# Patient Record
Sex: FEMALE | Race: WHITE | NOT HISPANIC OR LATINO | Employment: OTHER | URBAN - METROPOLITAN AREA
[De-identification: names, ages, dates, MRNs, and addresses within clinical notes are randomized per-mention and may not be internally consistent; named-entity substitution may affect disease eponyms.]

---

## 2017-07-03 ENCOUNTER — COMMUNICATION - HEALTHEAST (OUTPATIENT)
Dept: FAMILY MEDICINE | Facility: CLINIC | Age: 47
End: 2017-07-03

## 2017-08-23 ENCOUNTER — COMMUNICATION - HEALTHEAST (OUTPATIENT)
Dept: FAMILY MEDICINE | Facility: CLINIC | Age: 47
End: 2017-08-23

## 2017-08-24 ENCOUNTER — COMMUNICATION - HEALTHEAST (OUTPATIENT)
Dept: FAMILY MEDICINE | Facility: CLINIC | Age: 47
End: 2017-08-24

## 2017-09-11 ENCOUNTER — RECORDS - HEALTHEAST (OUTPATIENT)
Dept: ADMINISTRATIVE | Facility: OTHER | Age: 47
End: 2017-09-11

## 2017-09-11 ENCOUNTER — OFFICE VISIT - HEALTHEAST (OUTPATIENT)
Dept: FAMILY MEDICINE | Facility: CLINIC | Age: 47
End: 2017-09-11

## 2017-09-11 DIAGNOSIS — M67.40 GANGLION CYST: ICD-10-CM

## 2017-09-11 DIAGNOSIS — E03.9 HYPOTHYROIDISM, UNSPECIFIED TYPE: ICD-10-CM

## 2017-09-11 DIAGNOSIS — E55.9 VITAMIN D DEFICIENCY: ICD-10-CM

## 2017-09-11 ASSESSMENT — MIFFLIN-ST. JEOR: SCORE: 1702.03

## 2017-09-13 ENCOUNTER — COMMUNICATION - HEALTHEAST (OUTPATIENT)
Dept: FAMILY MEDICINE | Facility: CLINIC | Age: 47
End: 2017-09-13

## 2017-11-03 ENCOUNTER — OFFICE VISIT - HEALTHEAST (OUTPATIENT)
Dept: FAMILY MEDICINE | Facility: CLINIC | Age: 47
End: 2017-11-03

## 2017-11-03 ENCOUNTER — COMMUNICATION - HEALTHEAST (OUTPATIENT)
Dept: SCHEDULING | Facility: CLINIC | Age: 47
End: 2017-11-03

## 2017-11-03 ENCOUNTER — RECORDS - HEALTHEAST (OUTPATIENT)
Dept: GENERAL RADIOLOGY | Facility: CLINIC | Age: 47
End: 2017-11-03

## 2017-11-03 DIAGNOSIS — R10.9 UNSPECIFIED ABDOMINAL PAIN: ICD-10-CM

## 2017-11-03 DIAGNOSIS — R10.9 ABDOMINAL PAIN: ICD-10-CM

## 2018-07-13 ENCOUNTER — OFFICE VISIT - HEALTHEAST (OUTPATIENT)
Dept: FAMILY MEDICINE | Facility: CLINIC | Age: 48
End: 2018-07-13

## 2018-07-13 DIAGNOSIS — Z65.8 PSYCHOSOCIAL STRESSORS: ICD-10-CM

## 2018-07-13 DIAGNOSIS — Z79.899 MEDICATION MANAGEMENT: ICD-10-CM

## 2018-07-13 ASSESSMENT — MIFFLIN-ST. JEOR: SCORE: 1733.32

## 2018-08-24 ENCOUNTER — OFFICE VISIT - HEALTHEAST (OUTPATIENT)
Dept: FAMILY MEDICINE | Facility: CLINIC | Age: 48
End: 2018-08-24

## 2018-08-24 DIAGNOSIS — Z12.31 VISIT FOR SCREENING MAMMOGRAM: ICD-10-CM

## 2018-08-24 DIAGNOSIS — E55.9 VITAMIN D DEFICIENCY: ICD-10-CM

## 2018-08-24 DIAGNOSIS — F34.1 DYSTHYMIC DISORDER: ICD-10-CM

## 2018-08-24 DIAGNOSIS — E03.9 HYPOTHYROIDISM, UNSPECIFIED TYPE: ICD-10-CM

## 2018-08-24 LAB — TSH SERPL DL<=0.005 MIU/L-ACNC: 1.95 UIU/ML (ref 0.3–5)

## 2018-08-27 LAB — 25(OH)D3 SERPL-MCNC: 25.5 NG/ML (ref 30–80)

## 2018-09-05 ENCOUNTER — COMMUNICATION - HEALTHEAST (OUTPATIENT)
Dept: FAMILY MEDICINE | Facility: CLINIC | Age: 48
End: 2018-09-05

## 2018-09-12 ENCOUNTER — HOSPITAL ENCOUNTER (OUTPATIENT)
Dept: MAMMOGRAPHY | Facility: CLINIC | Age: 48
Discharge: HOME | End: 2018-09-12
Attending: FAMILY MEDICINE

## 2018-09-12 ENCOUNTER — COMMUNICATION - HEALTHEAST (OUTPATIENT)
Dept: FAMILY MEDICINE | Facility: CLINIC | Age: 48
End: 2018-09-12

## 2018-09-12 DIAGNOSIS — Z12.31 VISIT FOR SCREENING MAMMOGRAM: ICD-10-CM

## 2018-11-26 ENCOUNTER — COMMUNICATION - HEALTHEAST (OUTPATIENT)
Dept: FAMILY MEDICINE | Facility: CLINIC | Age: 48
End: 2018-11-26

## 2018-11-26 DIAGNOSIS — E03.9 HYPOTHYROIDISM, UNSPECIFIED TYPE: ICD-10-CM

## 2019-02-13 ENCOUNTER — COMMUNICATION - HEALTHEAST (OUTPATIENT)
Dept: FAMILY MEDICINE | Facility: CLINIC | Age: 49
End: 2019-02-13

## 2019-07-14 ENCOUNTER — COMMUNICATION - HEALTHEAST (OUTPATIENT)
Dept: FAMILY MEDICINE | Facility: CLINIC | Age: 49
End: 2019-07-14

## 2019-07-14 DIAGNOSIS — E03.9 HYPOTHYROIDISM, UNSPECIFIED TYPE: ICD-10-CM

## 2019-10-07 ENCOUNTER — COMMUNICATION - HEALTHEAST (OUTPATIENT)
Dept: FAMILY MEDICINE | Facility: CLINIC | Age: 49
End: 2019-10-07

## 2019-10-07 DIAGNOSIS — E03.9 HYPOTHYROIDISM, UNSPECIFIED TYPE: ICD-10-CM

## 2019-10-11 ENCOUNTER — OFFICE VISIT - HEALTHEAST (OUTPATIENT)
Dept: FAMILY MEDICINE | Facility: CLINIC | Age: 49
End: 2019-10-11

## 2019-10-11 DIAGNOSIS — Z00.00 HEALTHCARE MAINTENANCE: ICD-10-CM

## 2019-10-11 DIAGNOSIS — E55.9 VITAMIN D DEFICIENCY: ICD-10-CM

## 2019-10-11 DIAGNOSIS — E03.9 HYPOTHYROIDISM, UNSPECIFIED TYPE: ICD-10-CM

## 2019-10-11 LAB
T4 FREE SERPL-MCNC: 1.4 NG/DL (ref 0.7–1.8)
TSH SERPL DL<=0.005 MIU/L-ACNC: 0.07 UIU/ML (ref 0.3–5)

## 2019-10-14 ENCOUNTER — COMMUNICATION - HEALTHEAST (OUTPATIENT)
Dept: FAMILY MEDICINE | Facility: CLINIC | Age: 49
End: 2019-10-14

## 2019-10-14 ENCOUNTER — AMBULATORY - HEALTHEAST (OUTPATIENT)
Dept: FAMILY MEDICINE | Facility: CLINIC | Age: 49
End: 2019-10-14

## 2019-10-14 DIAGNOSIS — E03.9 HYPOTHYROIDISM, UNSPECIFIED TYPE: ICD-10-CM

## 2019-10-14 LAB — 25(OH)D3 SERPL-MCNC: 41.9 NG/ML (ref 30–80)

## 2019-11-07 ENCOUNTER — COMMUNICATION - HEALTHEAST (OUTPATIENT)
Dept: ADMINISTRATIVE | Facility: CLINIC | Age: 49
End: 2019-11-07

## 2019-11-25 ENCOUNTER — OFFICE VISIT - HEALTHEAST (OUTPATIENT)
Dept: FAMILY MEDICINE | Facility: CLINIC | Age: 49
End: 2019-11-25

## 2019-11-25 DIAGNOSIS — Z13.220 SCREENING CHOLESTEROL LEVEL: ICD-10-CM

## 2019-11-25 DIAGNOSIS — E03.9 HYPOTHYROIDISM, UNSPECIFIED TYPE: ICD-10-CM

## 2019-11-25 DIAGNOSIS — Z00.00 ROUTINE GENERAL MEDICAL EXAMINATION AT A HEALTH CARE FACILITY: ICD-10-CM

## 2019-11-25 DIAGNOSIS — Z13.1 SCREENING FOR DIABETES MELLITUS: ICD-10-CM

## 2019-11-25 DIAGNOSIS — E78.5 HYPERLIPIDEMIA, UNSPECIFIED HYPERLIPIDEMIA TYPE: ICD-10-CM

## 2019-11-25 ASSESSMENT — PATIENT HEALTH QUESTIONNAIRE - PHQ9: SUM OF ALL RESPONSES TO PHQ QUESTIONS 1-9: 0

## 2019-11-25 ASSESSMENT — MIFFLIN-ST. JEOR: SCORE: 1436.33

## 2019-12-03 ENCOUNTER — AMBULATORY - HEALTHEAST (OUTPATIENT)
Dept: LAB | Facility: CLINIC | Age: 49
End: 2019-12-03

## 2019-12-03 DIAGNOSIS — Z13.1 SCREENING FOR DIABETES MELLITUS: ICD-10-CM

## 2019-12-03 DIAGNOSIS — E03.9 HYPOTHYROIDISM, UNSPECIFIED TYPE: ICD-10-CM

## 2019-12-03 DIAGNOSIS — Z13.220 SCREENING CHOLESTEROL LEVEL: ICD-10-CM

## 2019-12-03 LAB
CHOLEST SERPL-MCNC: 206 MG/DL
FASTING STATUS PATIENT QL REPORTED: YES
FASTING STATUS PATIENT QL REPORTED: YES
GLUCOSE BLD-MCNC: 87 MG/DL (ref 70–125)
HDLC SERPL-MCNC: 74 MG/DL
LDLC SERPL CALC-MCNC: 98 MG/DL
TRIGL SERPL-MCNC: 168 MG/DL
TSH SERPL DL<=0.005 MIU/L-ACNC: 2.94 UIU/ML (ref 0.3–5)

## 2019-12-04 ENCOUNTER — AMBULATORY - HEALTHEAST (OUTPATIENT)
Dept: FAMILY MEDICINE | Facility: CLINIC | Age: 49
End: 2019-12-04

## 2019-12-04 ENCOUNTER — COMMUNICATION - HEALTHEAST (OUTPATIENT)
Dept: FAMILY MEDICINE | Facility: CLINIC | Age: 49
End: 2019-12-04

## 2019-12-04 DIAGNOSIS — E03.9 HYPOTHYROIDISM, UNSPECIFIED TYPE: ICD-10-CM

## 2020-06-03 ENCOUNTER — COMMUNICATION - HEALTHEAST (OUTPATIENT)
Dept: FAMILY MEDICINE | Facility: CLINIC | Age: 50
End: 2020-06-03

## 2020-06-03 DIAGNOSIS — E03.9 HYPOTHYROIDISM, UNSPECIFIED TYPE: ICD-10-CM

## 2021-04-29 ENCOUNTER — RECORDS - HEALTHEAST (OUTPATIENT)
Dept: FAMILY MEDICINE | Facility: CLINIC | Age: 51
End: 2021-04-29

## 2021-04-29 DIAGNOSIS — E03.9 HYPOTHYROIDISM, UNSPECIFIED TYPE: ICD-10-CM

## 2021-04-30 RX ORDER — LEVOTHYROXINE SODIUM 150 UG/1
150 TABLET ORAL DAILY
Qty: 30 TABLET | Refills: 0 | Status: SHIPPED | OUTPATIENT
Start: 2021-04-30 | End: 2021-07-20

## 2021-05-07 ENCOUNTER — AMBULATORY - HEALTHEAST (OUTPATIENT)
Dept: NURSING | Facility: CLINIC | Age: 51
End: 2021-05-07

## 2021-05-25 ENCOUNTER — COMMUNICATION - HEALTHEAST (OUTPATIENT)
Dept: FAMILY MEDICINE | Facility: CLINIC | Age: 51
End: 2021-05-25

## 2021-05-25 ENCOUNTER — AMBULATORY - HEALTHEAST (OUTPATIENT)
Dept: NURSING | Facility: CLINIC | Age: 51
End: 2021-05-25

## 2021-05-25 DIAGNOSIS — E03.9 HYPOTHYROIDISM, UNSPECIFIED TYPE: ICD-10-CM

## 2021-05-26 ASSESSMENT — PATIENT HEALTH QUESTIONNAIRE - PHQ9: SUM OF ALL RESPONSES TO PHQ QUESTIONS 1-9: 0

## 2021-05-30 NOTE — TELEPHONE ENCOUNTER
Refill Approved    Rx renewed per Medication Renewal Policy. Medication was last renewed on 11/28/18 #90 R-2.    Last OV 8/24/18  *Patient will need follow up with PCP prior to future refills.     Miri Byrd, [facility] Connection Triage/Med Refill 7/14/2019     Requested Prescriptions   Pending Prescriptions Disp Refills     levothyroxine (SYNTHROID, LEVOTHROID) 175 MCG tablet [Pharmacy Med Name: LEVOTHYROXINE 175 MCG TABLET] 90 tablet 2     Sig: TAKE ONE TABLET BY MOUTH ONE TIME DAILY.       Thyroid Hormones Protocol Passed - 7/14/2019 12:30 AM        Passed - Provider visit in past 12 months or next 3 months     Last office visit with prescriber/PCP: 8/24/2018 Karyn Mcdermott MD OR same dept: 8/24/2018 Karyn Mcdermott MD OR same specialty: 8/24/2018 Karyn Mcdermott MD  Last physical: Visit date not found Last MTM visit: Visit date not found   Next visit within 3 mo: Visit date not found  Next physical within 3 mo: Visit date not found  Prescriber OR PCP: Karyn Mcdermott MD  Last diagnosis associated with med order: 1. Hypothyroidism, unspecified type  - levothyroxine (SYNTHROID, LEVOTHROID) 175 MCG tablet [Pharmacy Med Name: LEVOTHYROXINE 175 MCG TABLET]; TAKE ONE TABLET BY MOUTH ONE TIME DAILY.  Dispense: 90 tablet; Refill: 2    If protocol passes may refill for 12 months if within 3 months of last provider visit (or a total of 15 months).             Passed - TSH on file in past 12 months for patient age 12 & older     TSH   Date Value Ref Range Status   08/24/2018 1.95 0.30 - 5.00 uIU/mL Final

## 2021-05-31 VITALS — BODY MASS INDEX: 40 KG/M2 | WEIGHT: 240.1 LBS | HEIGHT: 65 IN

## 2021-05-31 VITALS — WEIGHT: 236.1 LBS | BODY MASS INDEX: 39.9 KG/M2

## 2021-06-01 VITALS — WEIGHT: 247 LBS | BODY MASS INDEX: 41.15 KG/M2 | HEIGHT: 65 IN

## 2021-06-01 VITALS — BODY MASS INDEX: 41.61 KG/M2 | WEIGHT: 246.2 LBS

## 2021-06-02 NOTE — TELEPHONE ENCOUNTER
Left message for patient to call back. If patient calls back, please relay message below.    Patient would need to be seen at an office visit with Dr. Mcdermott before her medications can be refilled. She is overdue for labs.    Please assist in scheduling

## 2021-06-02 NOTE — PROGRESS NOTES
Chief complaint: Thyroid check  Vaccine update    HPI: The patient has been on a structured weight loss program and has been very successful.  Most of the meals are prepackaged.  She is starting to feel as though she wants to try to exercise.  She has about 20 more pounds to lose before she has her goal.    She is getting a flu shot today and will also update her tetanus shot since she has a new grandbaby who is due to be delivered next month    She is also due for a complete physical exam so we can repeat fasting lipids so she will get that scheduled as well    Objective:/80 (Patient Site: Right Arm, Patient Position: Sitting, Cuff Size: Adult Regular)   Pulse 92   Wt 179 lb 11.2 oz (81.5 kg)   SpO2 98%   BMI 30.37 kg/m    She was congratulated on her weight loss    Assessment: Hypothyroidism  Intentional weight loss  Vaccine update  Vitamin D deficiency    Plan: We will do TSH, free T4, and vitamin D because that has been low  When the lab results are returned I will refill her thyroid hormone  We will update her flu shot and her tetanus shot and follow-up with her physical

## 2021-06-02 NOTE — TELEPHONE ENCOUNTER
RN cannot approve Refill Request    RN can NOT refill this medication Protocol failed and NO refill given        Andreia Bonilla, [facility] Connection Triage/Med Refill 10/8/2019    Requested Prescriptions   Pending Prescriptions Disp Refills     levothyroxine (SYNTHROID, LEVOTHROID) 175 MCG tablet [Pharmacy Med Name: LEVOTHYROXINE 175 MCG TABLET] 90 tablet 3     Sig: TAKE 1 TABLET BY MOUTH EVERY DAY       Thyroid Hormones Protocol Failed - 10/7/2019  1:56 AM        Failed - Provider visit in past 12 months or next 3 months     Last office visit with prescriber/PCP: 8/24/2018 Karyn Mcdermott MD OR same dept: Visit date not found OR same specialty: 8/24/2018 Karyn Mcdermott MD  Last physical: Visit date not found Last MTM visit: Visit date not found   Next visit within 3 mo: Visit date not found  Next physical within 3 mo: Visit date not found  Prescriber OR PCP: Karyn Mcdermott MD  Last diagnosis associated with med order: 1. Hypothyroidism, unspecified type  - levothyroxine (SYNTHROID, LEVOTHROID) 175 MCG tablet [Pharmacy Med Name: LEVOTHYROXINE 175 MCG TABLET]; TAKE 1 TABLET BY MOUTH EVERY DAY  Dispense: 90 tablet; Refill: 0    If protocol passes may refill for 12 months if within 3 months of last provider visit (or a total of 15 months).             Failed - TSH on file in past 12 months for patient age 12 & older     TSH   Date Value Ref Range Status   08/24/2018 1.95 0.30 - 5.00 uIU/mL Final

## 2021-06-03 VITALS
OXYGEN SATURATION: 98 % | DIASTOLIC BLOOD PRESSURE: 80 MMHG | BODY MASS INDEX: 30.37 KG/M2 | HEART RATE: 92 BPM | SYSTOLIC BLOOD PRESSURE: 142 MMHG | WEIGHT: 179.7 LBS

## 2021-06-03 NOTE — PROGRESS NOTES
ASSESSMENT:  1. Routine general medical examination at a health care facility  Recommended mammogram    2. Hypothyroidism, unspecified type     - Thyroid Stimulating Hormone (TSH); Future    3. Hyperlipidemia, unspecified hyperlipidemia type       4. Screening for diabetes mellitus     - Glucose; Future    5. Screening cholesterol level     - Lipid Cascade; Future           PLAN:  There are no Patient Instructions on file for this visit.    Orders Placed This Encounter   Procedures     Lipid Cascade     Standing Status:   Future     Standing Expiration Date:   11/25/2020     Order Specific Question:   Fasting is required?     Answer:   Yes     Glucose     Standing Status:   Future     Standing Expiration Date:   11/25/2020     Thyroid Stimulating Hormone (TSH)     Standing Status:   Future     Standing Expiration Date:   11/25/2020     There are no discontinued medications.    Return in about 1 year (around 11/25/2020) for Annual physical.    Health Maintenance Due   Topic Date Due     PREVENTIVE CARE VISIT  1970     HPV TEST  1970     HIV SCREENING  07/09/1985     PAP SMEAR  07/09/1995     DEPRESSION FOLLOW UP  02/24/2019     MAMMOGRAM  09/12/2019       CHIEF COMPLAINT:  Chief Complaint   Patient presents with     Annual Exam     yearly - pt is not fasting      Depression     F/U        HISTORY OF PRESENT ILLNESS:  Ama Green is a 49 y.o. female presenting to the clinic today for a physical exam.     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  She is in a new relationship and sexually active and does not need me to do any STD screening.  She just became a grandmother for the first time last Thursday and she was able to be present after the delivery.    She is not fasting today but will come back and have fasting blood work completed.    Healthy Habits  Are you taking a daily aspirin? No  Do you typically exercising at least 40 min, 3-4 times per week?  Yes  Do you usually eat at least 4 servings  of fruit and vegetables a day, include whole grains and fiber and avoid regularly eating high fat foods? NO  Have you had an eye exam in the past two years? Yes  Do you see a dentist twice per year? NO  Do you have any concerns regarding sleep? No  Do you drink caffeine? YES    Safety Screen  If you own firearms, are they secured in a locked gun cabinet or with trigger locks? The patient does not own any firearms    REVIEW OF SYSTEMS:   All other systems are negative.    Immunization History   Administered Date(s) Administered     Influenza, Seasonal, Inj PF IIV3 10/31/2013     Influenza,seasonal quad, PF, =/> 6months 2015     Influenza,seasonal,quad inj =/> 6months 2016, 10/11/2019     Tdap 2009, 10/11/2019       GYNECOLOGIC HISTORY:  Last menstrual period: No LMP recorded. Patient has had a hysterectomy.  Contraception: condoms  Last Pap: hysterectomy Results were:    Last mammogram:   Results were: normal    OB History        2    Para   2    Term   2            AB        Living   2       SAB        TAB        Ectopic        Multiple        Live Births                     PFSH:     Social History     Tobacco Use   Smoking Status Never Smoker   Smokeless Tobacco Never Used     No family history on file.  Social History     Socioeconomic History     Marital status:      Spouse name: Darnell     Number of children: 2     Years of education: None     Highest education level: None   Occupational History     Occupation: sales   Social Needs     Financial resource strain: None     Food insecurity:     Worry: None     Inability: None     Transportation needs:     Medical: None     Non-medical: None   Tobacco Use     Smoking status: Never Smoker     Smokeless tobacco: Never Used   Substance and Sexual Activity     Alcohol use: None     Drug use: None     Sexual activity: None   Lifestyle     Physical activity:     Days per week: None     Minutes per session: None     Stress: None  "  Relationships     Social connections:     Talks on phone: None     Gets together: None     Attends Church service: None     Active member of club or organization: None     Attends meetings of clubs or organizations: None     Relationship status: None     Intimate partner violence:     Fear of current or ex partner: None     Emotionally abused: None     Physically abused: None     Forced sexual activity: None   Other Topics Concern     None   Social History Narrative     None     Past Surgical History:   Procedure Laterality Date     HYSTERECTOMY  2007     [place]  DELIVERY ONLY      Description:  Section;  Recorded: 2011;  Comments: 89 girl     [place]  DELIVERY ONLY      Description:  Section;  Recorded: 2011;  Comments: 89 girl     OK OPEN TX DISTAL PHALANGEAL FRACTURE EACH      Description: Open Treatment Of Fracture Of One Finger, Distal Phalanx;  Recorded: 2011;  Comments: right 5th finger; twice     OK REDUCTION OF LARGE BREAST      Description: Breast Surgery Reduction Procedure Bilateral;  Recorded: 2011;  Comments:      OK REMOVE TONSILS/ADENOIDS,<11 Y/O      Description: Tonsillectomy With Adenoidectomy;  Recorded: 2011;  Comments: X 2;  and      OK TOTAL ABDOM HYSTERECTOMY      Description: Hysterectomy;  Recorded: 2011;  Comments: enlarged uterus and 10\" polyp     REDUCTION MAMMAPLASTY Bilateral      Allergies   Allergen Reactions     Amoxicillin Anaphylaxis and Hives     Active Ambulatory Problems     Diagnosis Date Noted     Functional Urinary Incontinence      Hypothyroidism      Hyperlipidemia 01/15/2016     Resolved Ambulatory Problems     Diagnosis Date Noted     Anxiety      Depression      Dysthymic disorder      Vitamin D deficiency      Trochanteric Bursitis      No Additional Past Medical History       VITALS:  Vitals:    19 1402   BP: 146/81   Weight: 182 lb 6.4 oz (82.7 kg)   Height: 5' 4.25\" " (1.632 m)     BP Readings from Last 3 Encounters:   11/25/19 146/81   10/11/19 142/80   08/24/18 102/64     Wt Readings from Last 3 Encounters:   11/25/19 182 lb 6.4 oz (82.7 kg)   10/11/19 179 lb 11.2 oz (81.5 kg)   08/24/18 (!) 246 lb 3.2 oz (111.7 kg)     Body mass index is 31.07 kg/m .    PHYSICAL EXAM:  General Appearance: Alert, cooperative, no distress, appears stated age  Head: Normocephalic, without obvious abnormality, atraumatic  Eyes: PERRL, conjunctiva/corneas clear, EOM's intact  Ears: Normal TM's and external ear canals, both ears  Nose: Nares normal, septum midline,mucosa normal, no drainage  Throat: Lips, mucosa, and tongue normal; teeth and gums normal  Neck: Supple, symmetrical, trachea midline, no adenopathy;  thyroid: not enlarged, symmetric, no tenderness/mass/nodules;    Back: Symmetric, no curvature, ROM normal, no CVA tenderness  Lungs: Clear to auscultation bilaterally, respirations unlabored  Breasts: No breast masses, tenderness, asymmetry, or nipple discharge.  Heart: Regular rate and rhythm, S1 and S2 normal, no murmur, rub, or gallop, Abdomen: Soft, non-tender, bowel sounds active all four quadrants,  no masses, no organomegaly  Pelvic:Not examined  Extremities: Extremities normal, atraumatic, no cyanosis or edema  Skin: Skin color, texture, turgor normal, no rashes or lesions  Lymph nodes: Cervical, supraclavicular, and axillary nodes normal  Neurologic: Normal       QUALITY MEASURES:  The following high BMI interventions were performed this visit: encouragement to exercise and weight monitoring       MEDICATIONS:  Current Outpatient Medications   Medication Sig Dispense Refill     levothyroxine (SYNTHROID, LEVOTHROID) 150 MCG tablet Take 1 tablet (150 mcg total) by mouth Daily at 6:00 am. 90 tablet 0     No current facility-administered medications for this visit.

## 2021-06-04 VITALS
HEIGHT: 64 IN | WEIGHT: 182.4 LBS | BODY MASS INDEX: 31.14 KG/M2 | SYSTOLIC BLOOD PRESSURE: 146 MMHG | DIASTOLIC BLOOD PRESSURE: 81 MMHG

## 2021-06-08 NOTE — TELEPHONE ENCOUNTER
Refill Approved    Rx renewed per Medication Renewal Policy. Medication was last renewed on 12/4/19, last OV 11/25/19.    Brenna Barrios, [facility] Triage/Med Refill 6/7/2020     Requested Prescriptions   Pending Prescriptions Disp Refills     levothyroxine (SYNTHROID, LEVOTHROID) 150 MCG tablet [Pharmacy Med Name: LEVOTHYROXINE 150 MCG TABLET] 90 tablet 3     Sig: TAKE 1 TABLET (150 MCG TOTAL) BY MOUTH DAILY AT 6:00 AM.       Thyroid Hormones Protocol Passed - 6/3/2020 12:03 PM        Passed - Provider visit in past 12 months or next 3 months     Last office visit with prescriber/PCP: 10/11/2019 Karyn Mcdermott MD OR same dept: 10/11/2019 Karyn Mcdermott MD OR same specialty: 10/11/2019 Karyn Mcdermott MD  Last physical: 11/25/2019 Last MTM visit: Visit date not found   Next visit within 3 mo: Visit date not found  Next physical within 3 mo: Visit date not found  Prescriber OR PCP: Karyn Mcdermott MD  Last diagnosis associated with med order: 1. Hypothyroidism, unspecified type  - levothyroxine (SYNTHROID, LEVOTHROID) 150 MCG tablet [Pharmacy Med Name: LEVOTHYROXINE 150 MCG TABLET]; Take 1 tablet (150 mcg total) by mouth Daily at 6:00 am.  Dispense: 90 tablet; Refill: 3    If protocol passes may refill for 12 months if within 3 months of last provider visit (or a total of 15 months).             Passed - TSH on file in past 12 months for patient age 12 & older     TSH   Date Value Ref Range Status   12/03/2019 2.94 0.30 - 5.00 uIU/mL Final

## 2021-06-12 NOTE — PROGRESS NOTES
"Complaint: Thyroid medication check  Mass on left wrist    HPI: The patient is frustrated with her weight gain but otherwise feels great.  She is a  got a presidents award for [id number] this past year.  She loves it and wishes she had taken this career path many years ago.  She feels as though the thyroid dose is appropriate.  She is not fasting today which is fine because once her thyroid is appropriately regulated, her cholesterol is well managed.    She also reports a growing soft tissue mass in the dorsum of her left wrist.  It is uncomfortable and a little bit unsightly.    Objective:/88 (Patient Site: Right Arm, Patient Position: Sitting, Cuff Size: Adult Large)  Pulse 68  Ht 5' 4.5\" (1.638 m)  Wt (!) 240 lb 1.6 oz (108.9 kg)  Breastfeeding? No  BMI 40.58 kg/m2  I reviewed her labs and she is very overdue for having blood work completed for her thyroid.    Is a soft tissue cystic mass on the dorsum of her left wrist consistent with a ganglion cyst.    Assessment: Hypothyroidism  Ganglion cyst left wrist    Plan: I will refill her thyroid medication but I will check her thyroid and her vitamin D level.  She will be notified of those results.  I will also send her to a orthopedic surgeon and the hand surgery group to discuss options because likely she would like to have that drained before she would have a surgical approach.  "

## 2021-06-13 NOTE — PROGRESS NOTES
"  Subjective:      Ama Green is a 47 y.o. female who presents today for acute abdominal pain.    Vani states her abdominal pain started about 10 days ago on 10/25/2015.  She states last weekend she had severe abdominal pain 7/10.  She endorses not having a regular BM- which she normal has a BM daily.  She noted that a jarring car ride, after eating, and long periods of sitting make the pain worse.  Last weekend on Sunday she took ExLax 4 tablets and 2 tablets again on Monday.  She states she had minor diarrhea after the 2 doses of Ex lax.  On Tuesday, 10/31/2017, she said the pain was gone.  On Wednesday, 11/1/2017, she notes she felt \"okay\".  She also states she started a colon cleanse on Wednesday with no results of BM  Thursday-yesterday, 11/2/2017, she notes having abdominal bloating and mid back pain that started around 10 pm.  She states she was so uncomfortable in bed she tried sleeping in the chair and couch and was still unable to get comfortable.  Last night she rates her pain 5/10 and describes the pain as achy and discomfort. She has not been evaluated for symptoms.   Today she states her pain is more achy pain and rates it 2/10.  She has noted urinary frequency but also endorses increased water intake.  She notes feeling like she can't empty her bladder completely when she voids.  Denies blood in stool and urine, painful urination, urine frequency, urgency.   Denies fever, chills, left lower abdominal pain.   Endorses eating higher protein and ketogenic diet which is new for her. Increasing fatty foods.   Decrease in appetite 10 days ago with symptom start.  History of hysterectomy    She does have a history in 2004 of having had a kidney infection without symptoms. Presents today to rule out urinary infection before the weekend.     Reviewed past medical/surgical history, family history, social history, medications and updated chart below.       Allergies   Allergen Reactions     Amoxicillin " "Anaphylaxis and Hives     History reviewed. No pertinent past medical history.  Past Surgical History:   Procedure Laterality Date     [place]  DELIVERY ONLY      Description:  Section;  Recorded: 2011;  Comments: 89 girl     [place]  DELIVERY ONLY      Description:  Section;  Recorded: 2011;  Comments: 89 girl     [place] OPEN TX DISTAL PHALANGEAL FRACTURE EACH      Description: Open Treatment Of Fracture Of One Finger, Distal Phalanx;  Recorded: 2011;  Comments: right 5th finger; twice     [place] REDUCTION OF LARGE BREAST      Description: Breast Surgery Reduction Procedure Bilateral;  Recorded: 2011;  Comments:      [place] REMOVE TONSILS/ADENOIDS,<13 Y/O      Description: Tonsillectomy With Adenoidectomy;  Recorded: 2011;  Comments: X 2;  and      [place] TOTAL ABDOM HYSTERECTOMY      Description: Hysterectomy;  Recorded: 2011;  Comments: enlarged uterus and 10\" polyp     Social History     Social History     Marital status:      Spouse name: Darnell     Number of children: 2     Years of education: N/A     Occupational History     sales      Social History Main Topics     Smoking status: Never Smoker     Smokeless tobacco: Never Used     Alcohol use Not on file     Drug use: Not on file     Sexual activity: Not on file     Other Topics Concern     Not on file     Social History Narrative     History reviewed. No pertinent family history.  Current Outpatient Prescriptions   Medication Sig Dispense Refill     levothyroxine (SYNTHROID, LEVOTHROID) 175 MCG tablet TAKE ONE TABLET BY MOUTH ONE TIME DAILY. 90 tablet 3     No current facility-administered medications for this visit.      Patient Active Problem List    Diagnosis Date Noted     Hyperlipidemia 01/15/2016     Functional Urinary Incontinence      Hypothyroidism      Dysthymic Disorder      Vitamin D Deficiency        Review of Systems   Constitutional: Negative.   HENT: Negative.   Eyes: " Negative.   Respiratory: Negative.   Cardiovascular: Negative.   Gastrointestinal: Abdominal pain that radiates to mid back.  Decreased appetite over past 10 days.  Diarrhea and less frequent BMs.   Endocrine: Negative.   Genitourinary: Denies dysuria.  Endorses feeling like she is not able to empty her bladder fully.   Musculoskeletal: Negative.   Skin: Negative.   Allergic/Immunologic: Negative.   Neurological: Negative.   Hematological: Negative.   Psychiatric/Behavioral: Negative.     Objective:    Physical Exam   /84 (Patient Site: Left Arm, Patient Position: Sitting, Cuff Size: Adult Large)  Pulse 72  Wt (!) 236 lb 1.6 oz (107.1 kg)  BMI 39.9 kg/m2    Constitutional: Alert and oriented x3, well nourished without any acute distress  Cardiovascular: Normal S1 and S2. Regular rate, rhythm and no murmur, rubs or gallops. Palpable distal pulses bilaterally.  Pulmonary/Chest: Normal effort. Lungs clear to auscultation in all lobes. Without wheezing, rhonchi or crackles.    Abdominal: Soft.  Slight tenderness to palpation of right lower quadrant. There is no rebound or guarding. Bowel sounds x4. Without organomegaly. No CVA tenderness.  Skin: Dry and intact; without rashes or lesions.   Psychiatric: Normal mood and affect.     Assessment/Plan:    1. Abdominal pain  10 day history of abdominal pain.  Denies nausea, vomiting,fever, chills and recent sick contacts.  Endorses having pain after eating starting 10/25/2017.  Endorses having decreased appetites and diarrhea.  Xray was normal and did not show any constipation or cause for her abdominal pain.   Pain noted with palpation to right lower quadrant.  Of notes she had a kidney infection in 2004 without symptoms.  I will order labs to rule out kidney/bladder infection, gallbladder issues/gallstones, infectious process, and constipation.  I will follow up with results once received. I did recommend she avoid fried/fatty foods since her pain is occurring after  eating.   Discussed red flags that would warrant urgent evaluation if needed over the weekend. Patient verbalized understanding.  Discussed with patient to follow up with PCP if worsening symptoms, questions, or concerns.  Patient agreed and appeared pleased with plan      XR Abdomen Flat and Upright (Order [id number])   Imaging   Date: 11/3/2017 Department: [facility] X-Ray Released By: RT Laura (R) Authorizing: Marybeth Clark NP   Study Result   XR ABDOMEN FLAT AND UPRIGHT  11/3/2017 12:34 PM     INDICATION: Constipation, chronic abdominal pain.  COMPARISON: None.     FINDINGS: Negative abdomen. Bowel gas pattern is normal. Nothing for obstruction or free air. No evidence for renal stones. Slight convex right lumbar curve.     This report was electronically interpreted by: Dr. Chihco Murphy MD ON 11/03/2017 at 13:33         - Urinalysis-UC if Indicated  - Lipase  - Comprehensive Metabolic Panel  - HM1(CBC and Differential)  - XR Abdomen Flat and Upright; Future  - HM1 (CBC with Diff)    Recent Results (from the past 72 hour(s))   Urinalysis-UC if Indicated   Result Value Ref Range    Color, UA Yellow Colorless, Yellow, Straw, Light Yellow    Clarity, UA Clear Clear    Glucose, UA Negative Negative    Bilirubin, UA Negative Negative    Ketones, UA Trace (!) Negative    Specific Gravity, UA 1.010 1.005 - 1.030    Blood, UA Negative Negative    pH, UA 6.0 5.0 - 8.0    Protein, UA Negative Negative mg/dL    Urobilinogen, UA 0.2 E.U./dL 0.2 E.U./dL, 1.0 E.U./dL    Nitrite, UA Negative Negative    Leukocytes, UA Negative Negative   Lipase   Result Value Ref Range    Lipase 41 0 - 52 U/L   Comprehensive Metabolic Panel   Result Value Ref Range    Sodium 141 136 - 145 mmol/L    Potassium 4.0 3.5 - 5.0 mmol/L    Chloride 104 98 - 107 mmol/L    CO2 26 22 - 31 mmol/L    Anion Gap, Calculation 11 5 - 18 mmol/L    Glucose 93 70 - 125 mg/dL    BUN 5 (L) 8 - 22 mg/dL    Creatinine 0.62 0.60 - 1.10 mg/dL     GFR MDRD Af Amer >60 >60 mL/min/1.73m2    GFR MDRD Non Af Amer >60 >60 mL/min/1.73m2    Bilirubin, Total 0.4 0.0 - 1.0 mg/dL    Calcium 8.9 8.5 - 10.5 mg/dL    Protein, Total 6.7 6.0 - 8.0 g/dL    Albumin 3.3 (L) 3.5 - 5.0 g/dL    Alkaline Phosphatase 53 45 - 120 U/L    AST 13 0 - 40 U/L    ALT 14 0 - 45 U/L   HM1 (CBC with Diff)   Result Value Ref Range    WBC 7.7 4.0 - 11.0 thou/uL    RBC 4.77 3.80 - 5.40 mill/uL    Hemoglobin 14.4 12.0 - 16.0 g/dL    Hematocrit 42.3 35.0 - 47.0 %    MCV 89 80 - 100 fL    MCH 30.2 27.0 - 34.0 pg    MCHC 34.0 32.0 - 36.0 g/dL    RDW 11.5 11.0 - 14.5 %    Platelets 323 140 - 440 thou/uL    MPV 6.9 (L) 7.0 - 10.0 fL    Neutrophils % 59 50 - 70 %    Lymphocytes % 31 20 - 40 %    Monocytes % 7 2 - 10 %    Eosinophils % 3 0 - 6 %    Basophils % 1 0 - 2 %    Neutrophils Absolute 4.5 2.0 - 7.7 thou/uL    Lymphocytes Absolute 2.3 0.8 - 4.4 thou/uL    Monocytes Absolute 0.5 0.0 - 0.9 thou/uL    Eosinophils Absolute 0.2 0.0 - 0.4 thou/uL    Basophils Absolute 0.1 0.0 - 0.2 thou/uL     Office visit was over 40 minutes, obtaining xray, labs - reviewing and over 50% was spent counseling Ama.   Office visit and charting was completed with Grisel Lundy, studentNP      Marybeth Clark, CNP  11/5/2017

## 2021-06-17 NOTE — TELEPHONE ENCOUNTER
RN cannot approve Refill Request    RN can NOT refill this medication PCP messaged that patient is overdue for Office Visit. Last office visit: 10/11/2019 Karyn Mcdermott MD Last Physical: 11/25/2019 Last MTM visit: Visit date not found Last visit same specialty: 10/11/2019 Karyn Mcdermott MD.  Next visit within 3 mo: Visit date not found  Next physical within 3 mo: Visit date not found      Brenna Barrios, [facility] Triage/Med Refill 4/29/2021    Requested Prescriptions   Pending Prescriptions Disp Refills     levothyroxine (SYNTHROID, LEVOTHROID) 150 MCG tablet [Pharmacy Med Name: LEVOTHYROXINE 150 MCG TABLET] 90 tablet 1     Sig: TAKE 1 TABLET (150 MCG TOTAL) BY MOUTH DAILY AT 6:00 AM.       Thyroid Hormones Protocol Failed - 4/29/2021  1:36 AM        Failed - Provider visit in past 12 months or next 3 months     Last office visit with prescriber/PCP: 10/11/2019 Karyn Mcdermott MD OR same dept: Visit date not found OR same specialty: 10/11/2019 Karyn Mcdermott MD  Last physical: 11/25/2019 Last MTM visit: Visit date not found   Next visit within 3 mo: Visit date not found  Next physical within 3 mo: Visit date not found  Prescriber OR PCP: Karyn Mcdermott MD  Last diagnosis associated with med order: 1. Hypothyroidism, unspecified type  - levothyroxine (SYNTHROID, LEVOTHROID) 150 MCG tablet [Pharmacy Med Name: LEVOTHYROXINE 150 MCG TABLET]; Take 1 tablet (150 mcg total) by mouth Daily at 6:00 am.  Dispense: 90 tablet; Refill: 1    If protocol passes may refill for 12 months if within 3 months of last provider visit (or a total of 15 months).             Failed - TSH on file in past 12 months for patient age 12 & older     TSH   Date Value Ref Range Status   12/03/2019 2.94 0.30 - 5.00 uIU/mL Final

## 2021-06-17 NOTE — TELEPHONE ENCOUNTER
I called and LVMTCB, if/when patient calls back, please inform patient that they are due for a physical / medication check with PCP.

## 2021-06-19 NOTE — LETTER
Letter by Karyn Mcdermott MD at      Author: Karyn Mcdermott MD Service: -- Author Type: --    Filed:  Encounter Date: 11/7/2019 Status: Signed         Ama Green  [address]  [facility] [address]               November 7, 2019    Dear Ama:    Our records indicate that you are due for a mammogram.    In the [place], one in nine women will develop breast cancer during their lifetime. While there is no way to prevent breast cancer, early detection provides the best opportunity for curing it.    For women over the age of 40, the American Cancer Society recommends a yearly clinical breast exam and a yearly mammogram. These practices have saved thousands of lives. We need your help to ensure that you are receiving optimal medical care.    Please make an appointment for a mammogram at your earliest convenience. [phone]    Sincerely,        Karyn Mcdermott MD

## 2021-06-19 NOTE — PROGRESS NOTES
"Chief complaint: Depression anxiety    HPI: The patient has had a lot of psychosocial stressors lately.  She has 2 adult daughters.  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  Her  has had lots of health issues and has had a drinking problem in the past and he quit drinking about a year and a half ago.  He still has a lot of anger and a lot of issues that he is not dealing with and now he is just a \"dry drunk\".  There have been some disc contents in their marriage of the last 25 years so she has been on a hedge for about the last 8 months or so and finally things came to a head last week where her  told her he wanted a divorce.  She now is in a planning mode to figure out how they are going to separate her assets and finances.    She has been crying herself to sleep every night for months and while she does not think that she is quite ready for counseling to do with this issue, she is ready to go back on medication for depression and anxiety.    Objective:/82  Pulse 75  Ht 5' 4.5\" (1.638 m)  Wt (!) 247 lb (112 kg)  SpO2 98%  BMI 41.74 kg/m2  Her PHQ 9 and her EVA 7 scores are very high.  She is somewhat weepy at times during the interview.  When I looked through her chart I found that 5- 6 years ago she was on Lexapro so we decided to start a low-dose of Lexapro as it had worked well for her in the past.  She will look to see if she needs to talk to a counselor and let me know if she needs to pursue that.    Assessment: Psychosocial stressors with depression and anxiety  Medications start    Plan: Start Lexapro 5 mg tablets daily and have her come back in about 6 weeks to discuss the efficacy of this in the meantime if she needs me to help her schedule an appointment with the [place] [facility] we can do that as well.    Total time spent was over 25 minutes today and all that time was counseling care coordination and discussion  "

## 2021-06-20 NOTE — PROGRESS NOTES
Chief complaint: Medication check    HPI: The patient has been going through a lot of psychosocial stressors and marital strife and her  of after 25 years decided he wanted a divorce.  We started her on a low-dose of Lexapro and she really believes that she is doing better.  She feels as though she is going to have another difficult time in a week when he moves out of the house and then in about 3 or 4 weeks when the  authorizes the final divorce decree.  She will be losing the rest of her health insurance when the divorce is final because she cannot stay on his health insurance right now    /64 (Patient Site: Right Arm, Patient Position: Sitting, Cuff Size: Adult Large)  Pulse 68  Wt (!) 246 lb 3.2 oz (111.7 kg)  Breastfeeding? No  BMI 41.61 kg/m2  Her affect is bright.  Her PHQ 9 and EVA 7 were completed.  She feels better but not all the way healed yet of course.  New    Assessment: Dysthymia with situational stressors    Plan: We will write the Lexapro so she can take 1 or 2 tablets daily as directed.  She is also due to have her vitamin D and her thyroid checked so we will do that today.  I suspect that she runs into more stressful times as when her  moves out and the divorce degree is final, she will need a little bit of boost in her medication and so I told her she can do that take 1 or 2 at a time and then go back to 1 if she needs to so she was comfortable with that plan

## 2021-06-25 NOTE — TELEPHONE ENCOUNTER
RN cannot approve Refill Request    RN can NOT refill this medication PCP messaged that patient is overdue for Labs and Office Visit. Last office visit: Visit date not found Last Physical: Visit date not found Last MTM visit: Visit date not found Last visit same specialty: 10/11/2019 Krayn Mcdermott MD.  Next visit within 3 mo: Visit date not found  Next physical within 3 mo: Visit date not found      Nata Enriquez, [facility] Triage/Med Refill 5/26/2021    Requested Prescriptions   Pending Prescriptions Disp Refills     levothyroxine (SYNTHROID, LEVOTHROID) 150 MCG tablet 30 tablet 0     Sig: Take 1 tablet (150 mcg total) by mouth Daily at 6:00 am.       Thyroid Hormones Protocol Failed - 5/25/2021  6:57 PM        Failed - Provider visit in past 12 months or next 3 months     Last office visit with prescriber/PCP: Visit date not found OR same dept: Visit date not found OR same specialty: 10/11/2019 Karyn Mcdermott MD  Last physical: Visit date not found Last MTM visit: Visit date not found   Next visit within 3 mo: Visit date not found  Next physical within 3 mo: Visit date not found  Prescriber OR PCP: Grisel Emery MD  Last diagnosis associated with med order: 1. Hypothyroidism, unspecified type  - levothyroxine (SYNTHROID, LEVOTHROID) 150 MCG tablet; Take 1 tablet (150 mcg total) by mouth Daily at 6:00 am.  Dispense: 30 tablet; Refill: 0    If protocol passes may refill for 12 months if within 3 months of last provider visit (or a total of 15 months).             Failed - TSH on file in past 12 months for patient age 12 & older     TSH   Date Value Ref Range Status   12/03/2019 2.94 0.30 - 5.00 uIU/mL Final

## 2021-06-27 ENCOUNTER — HEALTH MAINTENANCE LETTER (OUTPATIENT)
Age: 51
End: 2021-06-27

## 2021-10-17 ENCOUNTER — HEALTH MAINTENANCE LETTER (OUTPATIENT)
Age: 51
End: 2021-10-17

## 2021-10-18 ENCOUNTER — MYC REFILL (OUTPATIENT)
Dept: FAMILY MEDICINE | Facility: CLINIC | Age: 51
End: 2021-10-18

## 2021-10-18 DIAGNOSIS — E03.9 HYPOTHYROIDISM, UNSPECIFIED TYPE: ICD-10-CM

## 2021-10-19 DIAGNOSIS — E03.9 HYPOTHYROIDISM, UNSPECIFIED TYPE: ICD-10-CM

## 2021-10-19 NOTE — TELEPHONE ENCOUNTER
"Routing refill request to provider for review/approval because:  Labs not current:  Last TSH 12/3/2019  Patient needs to be seen because it has been more than 1 year since last office visit.    Last Written Prescription Date:  7/25/21  Last Fill Quantity: 30,  # refills: 0   Last office visit provider:  11/25/2019     Requested Prescriptions   Pending Prescriptions Disp Refills     levothyroxine (SYNTHROID/LEVOTHROID) 150 MCG tablet 30 tablet 0     Sig: Take 1 tablet (150 mcg) by mouth daily       Thyroid Protocol Failed - 10/18/2021  9:43 AM        Failed - Recent (12 mo) or future (30 days) visit within the authorizing provider's specialty     Patient has had an office visit with the authorizing provider or a provider within the authorizing providers department within the previous 12 mos or has a future within next 30 days. See \"Patient Info\" tab in inbasket, or \"Choose Columns\" in Meds & Orders section of the refill encounter.              Failed - Normal TSH on file in past 12 months     Recent Labs   Lab Test 12/03/19  0816   TSH 2.94              Passed - Patient is 12 years or older        Passed - Medication is active on med list        Passed - No active pregnancy on record     If patient is pregnant or has had a positive pregnancy test, please check TSH.          Passed - No positive pregnancy test in past 12 months     If patient is pregnant or has had a positive pregnancy test, please check TSH.               Griselda Kelly RN 10/19/21 9:17 AM  "

## 2021-10-20 RX ORDER — LEVOTHYROXINE SODIUM 150 UG/1
150 TABLET ORAL DAILY
Qty: 30 TABLET | Refills: 0 | Status: SHIPPED | OUTPATIENT
Start: 2021-10-20 | End: 2021-11-10

## 2021-10-20 RX ORDER — LEVOTHYROXINE SODIUM 150 UG/1
TABLET ORAL
Qty: 30 TABLET | Refills: 0 | OUTPATIENT
Start: 2021-10-20

## 2021-11-08 ENCOUNTER — OFFICE VISIT (OUTPATIENT)
Dept: FAMILY MEDICINE | Facility: CLINIC | Age: 51
End: 2021-11-08
Payer: COMMERCIAL

## 2021-11-08 VITALS
BODY MASS INDEX: 35.24 KG/M2 | DIASTOLIC BLOOD PRESSURE: 62 MMHG | WEIGHT: 206.9 LBS | HEART RATE: 66 BPM | SYSTOLIC BLOOD PRESSURE: 124 MMHG

## 2021-11-08 DIAGNOSIS — E03.9 HYPOTHYROIDISM, UNSPECIFIED TYPE: Primary | ICD-10-CM

## 2021-11-08 DIAGNOSIS — Z12.11 SCREEN FOR COLON CANCER: ICD-10-CM

## 2021-11-08 DIAGNOSIS — Z13.9 SCREENING FOR CONDITION: ICD-10-CM

## 2021-11-08 DIAGNOSIS — E66.01 MORBID OBESITY (H): ICD-10-CM

## 2021-11-08 LAB — TSH SERPL DL<=0.005 MIU/L-ACNC: 0.85 UIU/ML (ref 0.3–5)

## 2021-11-08 PROCEDURE — 36415 COLL VENOUS BLD VENIPUNCTURE: CPT | Performed by: FAMILY MEDICINE

## 2021-11-08 PROCEDURE — 84443 ASSAY THYROID STIM HORMONE: CPT | Performed by: FAMILY MEDICINE

## 2021-11-08 PROCEDURE — 99213 OFFICE O/P EST LOW 20 MIN: CPT | Performed by: FAMILY MEDICINE

## 2021-11-08 NOTE — PROGRESS NOTES
Chief complaint: Thyroid recheck    HPI: The patient has not had her thyroid checked for almost 2 years so we will order to do that today.  I also just did a cursory review of health maintenance and she does not want a flu shot today but she is willing to have me order her mammogram and she is willing to have me order Cologuard for colon cancer screening.    She and her  have been  for 3 years and it is quite amicable.  She has found another man, is engaged, and they are getting  in March.    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

## 2021-11-10 DIAGNOSIS — E03.9 HYPOTHYROIDISM, UNSPECIFIED TYPE: ICD-10-CM

## 2021-11-10 RX ORDER — LEVOTHYROXINE SODIUM 150 UG/1
150 TABLET ORAL DAILY
Qty: 90 TABLET | Refills: 3 | Status: SHIPPED | OUTPATIENT
Start: 2021-11-10 | End: 2022-11-25

## 2021-11-29 ENCOUNTER — E-VISIT (OUTPATIENT)
Dept: FAMILY MEDICINE | Facility: CLINIC | Age: 51
End: 2021-11-29
Payer: COMMERCIAL

## 2021-11-29 DIAGNOSIS — L40.9 PSORIASIS: Primary | ICD-10-CM

## 2021-11-29 PROCEDURE — 99421 OL DIG E/M SVC 5-10 MIN: CPT | Performed by: FAMILY MEDICINE

## 2021-11-29 RX ORDER — MOMETASONE FUROATE 1 MG/ML
SOLUTION TOPICAL DAILY
Qty: 60 ML | Refills: 3 | Status: SHIPPED | OUTPATIENT
Start: 2021-11-29

## 2021-12-02 LAB — COLOGUARD-ABSTRACT: NEGATIVE

## 2021-12-28 ENCOUNTER — IMMUNIZATION (OUTPATIENT)
Dept: NURSING | Facility: CLINIC | Age: 51
End: 2021-12-28
Payer: COMMERCIAL

## 2021-12-28 PROCEDURE — 0004A PR COVID VAC PFIZER DIL RECON 30 MCG/0.3 ML IM: CPT

## 2021-12-28 PROCEDURE — 91300 PR COVID VAC PFIZER DIL RECON 30 MCG/0.3 ML IM: CPT

## 2021-12-30 ENCOUNTER — OFFICE VISIT (OUTPATIENT)
Dept: FAMILY MEDICINE | Facility: CLINIC | Age: 51
End: 2021-12-30
Payer: COMMERCIAL

## 2021-12-30 VITALS
WEIGHT: 206.38 LBS | DIASTOLIC BLOOD PRESSURE: 82 MMHG | BODY MASS INDEX: 35.23 KG/M2 | SYSTOLIC BLOOD PRESSURE: 118 MMHG | HEART RATE: 90 BPM | HEIGHT: 64 IN | OXYGEN SATURATION: 98 %

## 2021-12-30 DIAGNOSIS — L98.9 SKIN LESION: ICD-10-CM

## 2021-12-30 DIAGNOSIS — E78.2 MIXED HYPERLIPIDEMIA: ICD-10-CM

## 2021-12-30 DIAGNOSIS — E03.9 HYPOTHYROIDISM, UNSPECIFIED TYPE: ICD-10-CM

## 2021-12-30 DIAGNOSIS — Z00.00 ROUTINE GENERAL MEDICAL EXAMINATION AT A HEALTH CARE FACILITY: Primary | ICD-10-CM

## 2021-12-30 PROCEDURE — 99396 PREV VISIT EST AGE 40-64: CPT | Performed by: FAMILY MEDICINE

## 2021-12-30 ASSESSMENT — MIFFLIN-ST. JEOR: SCORE: 1540.08

## 2021-12-30 NOTE — PROGRESS NOTES
SUBJECTIVE:   CC: Ama Green is an 51 year old woman who presents for preventive health visit.   She is in to have her thyroid addressed last month.    She is doing well and is fully vaccinated against Covid and is not inclined to get the flu shot this year and is going to defer on the shingles for now.    She is getting  in March and then they are going to [facility] in [place].    She had an ED visit for some sort of dermatitis on her scalp and the topical steroid lotion is working okay I suggested she also try some Nizoral shampoo and if that is not helpful we can have her see a dermatologist     Patient has been advised of split billing requirements and indicates understanding: Yes  Healthy Habits:     Getting at least 3 servings of Calcium per day:  Yes    Bi-annual eye exam:  NO    Dental care twice a year:  NO    Sleep apnea or symptoms of sleep apnea:  Excessive snoring    Diet:  Carbohydrate counting    Frequency of exercise:  1 day/week    Duration of exercise:  15-30 minutes    Taking medications regularly:  Yes    Medication side effects:  None    PHQ-2 Total Score: 0    Additional concerns today:  Yes       Today's PHQ-2 Score:   PHQ-2 ( 1999 Pfizer) 12/30/2021   Q1: Little interest or pleasure in doing things 0   Q2: Feeling down, depressed or hopeless 0   PHQ-2 Score 0   Q1: Little interest or pleasure in doing things Not at all   Q2: Feeling down, depressed or hopeless Not at all   PHQ-2 Score 0       Abuse: Current or Past (Physical, Sexual or Emotional) - No  Do you feel safe in your environment? Yes    Have you ever done Advance Care Planning? (For example, a Health Directive, POLST, or a discussion with a medical provider or your loved ones about your wishes):  Not yet    Social History     Tobacco Use     Smoking status: Never Smoker     Smokeless tobacco: Never Used   Substance Use Topics     Alcohol use: Yes          Alcohol Use 12/30/2021   Prescreen: >3 drinks/day or >7  "drinks/week? Yes   AUDIT SCORE  6        Reviewed orders with patient.  Reviewed health maintenance and updated orders accordingly - Yes       Breast Cancer Screening:    Breast CA Risk Assessment (FHS-7) 2021   Do you have a family history of breast, colon, or ovarian cancer? No / Unknown         Mammogram Screening: Recommended annual mammography  Pertinent mammograms are reviewed under the imaging tab.    History of abnormal Pap smear: Status post benign hysterectomy. Health Maintenance and Surgical History updated.     Reviewed and updated as needed this visit by clinical staff  Tobacco  Allergies  Meds  Problems            Reviewed and updated as needed this visit by Provider     Problems           No past medical history on file.   Past Surgical History:   Procedure Laterality Date     HC OPEN TX DISTAL FINGER FX, EACH (INCLUDES EXT FIX)      Description: Open Treatment Of Fracture Of One Finger, Distal Phalanx;  Recorded: 2011;  Comments: right 5th finger; twice     HC REDUCTION OF LARGE BREAST      Description: Breast Surgery Reduction Procedure Bilateral;  Recorded: 2011;  Comments:      HC REMOVE TONSILS/ADENOIDS,<11 Y/O      Description: Tonsillectomy With Adenoidectomy;  Recorded: 2011;  Comments: X 2;  and      HYSTERECTOMY  2007     MAMMOPLASTY REDUCTION Bilateral      ZZC  DELIVERY ONLY      Description:  Section;  Recorded: 2011;  Comments: 89 girl     ZZC  DELIVERY ONLY      Description:  Section;  Recorded: 2011;  Comments: 89 girl     ZZC TOTAL ABDOM HYSTERECTOMY      Description: Hysterectomy;  Recorded: 2011;  Comments: enlarged uterus and 10\" polyp     OB History    Para Term  AB Living   2 2 2 0 0 0   SAB IAB Ectopic Multiple Live Births   0 0 0 0 0      # Outcome Date GA Lbr Bernard/2nd Weight Sex Delivery Anes PTL Lv   2 Term            1 Term                Review of " "Systems  CONSTITUTIONAL: NEGATIVE for fever, chills, change in weight  INTEGUMENTARY/SKIN: NEGATIVE for worrisome rashes, moles or lesions  EYES: NEGATIVE for vision changes or irritation  ENT: NEGATIVE for ear, mouth and throat problems  RESP: NEGATIVE for significant cough or SOB  BREAST: NEGATIVE for masses, tenderness or discharge  CV: NEGATIVE for chest pain, palpitations or peripheral edema  GI: NEGATIVE for nausea, abdominal pain, heartburn, or change in bowel habits  : NEGATIVE for unusual urinary or vaginal symptoms. No vaginal bleeding.  MUSCULOSKELETAL: NEGATIVE for significant arthralgias or myalgia  NEURO: NEGATIVE for weakness, dizziness or paresthesias  PSYCHIATRIC: NEGATIVE for changes in mood or affect      OBJECTIVE:   /82 (BP Location: Right arm, Patient Position: Sitting, Cuff Size: Adult Large)   Pulse 90   Ht 1.632 m (5' 4.25\")   Wt 93.6 kg (206 lb 6 oz)   SpO2 98%   BMI 35.15 kg/m    Physical Exam  GENERAL: healthy, alert and no distress  EYES: Eyes grossly normal to inspection, PERRL and conjunctivae and sclerae normal  HENT: normal cephalic/atraumatic and ear canals and TM's normal  NECK: no adenopathy, no asymmetry, masses, or scars and thyroid normal to palpation  RESP: lungs clear to auscultation - no rales, rhonchi or wheezes  BREAST: normal without masses, tenderness or nipple discharge and no palpable axillary masses or adenopathy  CV: regular rate and rhythm, normal S1 S2, no S3 or S4, no murmur, click or rub, no peripheral edema and peripheral pulses strong  ABDOMEN: soft, nontender, no hepatosplenomegaly, no masses and bowel sounds normal  MS: no gross musculoskeletal defects noted, no edema  SKIN: no suspicious lesions or rashes  NEURO: Normal strength and tone, mentation intact and speech normal  PSYCH: mentation appears normal, affect normal/bright    Diagnostic Test Results:  Labs reviewed in Epic    ASSESSMENT/PLAN:       ICD-10-CM    1. Routine general medical " "examination at a health care facility  Z00.00    2. Hypothyroidism, unspecified type  E03.9    3. Mixed hyperlipidemia  E78.2    4. Skin lesion  L98.9 Adult Dermatology Referral       Patient has been advised of split billing requirements and indicates understanding: Yes  COUNSELING:  Reviewed preventive health counseling, as reflected in patient instructions       Regular exercise       Healthy diet/nutrition       Vision screening       Colon cancer screening       Consider Hep C screening for all patients one time for ages 18-79 years       HIV screeninx in teen years, 1x in adult years, and at intervals if high risk    Estimated body mass index is 35.15 kg/m  as calculated from the following:    Height as of this encounter: 1.632 m (5' 4.25\").    Weight as of this encounter: 93.6 kg (206 lb 6 oz).    Weight management plan: Discussed healthy diet and exercise guidelines    She reports that she has never smoked. She has never used smokeless tobacco.      Counseling Resources:  ATP IV Guidelines  Pooled Cohorts Equation Calculator  Breast Cancer Risk Calculator  BRCA-Related Cancer Risk Assessment: FHS-7 Tool  FRAX Risk Assessment  ICSI Preventive Guidelines  Dietary Guidelines for Americans, 2010  USDA's MyPlate  ASA Prophylaxis  Lung CA Screening    Karyn Mcdermott MD  [facility]  "

## 2022-01-05 ENCOUNTER — HOSPITAL ENCOUNTER (OUTPATIENT)
Dept: MAMMOGRAPHY | Facility: CLINIC | Age: 52
Discharge: HOME | End: 2022-01-05
Attending: FAMILY MEDICINE | Admitting: FAMILY MEDICINE
Payer: COMMERCIAL

## 2022-01-05 DIAGNOSIS — Z13.9 SCREENING FOR CONDITION: ICD-10-CM

## 2022-01-05 PROCEDURE — 77067 SCR MAMMO BI INCL CAD: CPT

## 2022-04-04 ENCOUNTER — TRANSFERRED RECORDS (OUTPATIENT)
Dept: HEALTH INFORMATION MANAGEMENT | Facility: CLINIC | Age: 52
End: 2022-04-04
Payer: COMMERCIAL

## 2022-08-08 ENCOUNTER — OFFICE VISIT (OUTPATIENT)
Dept: FAMILY MEDICINE | Facility: CLINIC | Age: 52
End: 2022-08-08
Payer: COMMERCIAL

## 2022-08-08 VITALS
HEART RATE: 108 BPM | WEIGHT: 199 LBS | SYSTOLIC BLOOD PRESSURE: 138 MMHG | BODY MASS INDEX: 33.89 KG/M2 | DIASTOLIC BLOOD PRESSURE: 82 MMHG

## 2022-08-08 DIAGNOSIS — E03.1 CONGENITAL HYPOTHYROIDISM WITHOUT GOITER: ICD-10-CM

## 2022-08-08 DIAGNOSIS — H61.23 BILATERAL IMPACTED CERUMEN: Primary | ICD-10-CM

## 2022-08-08 PROCEDURE — 69209 REMOVE IMPACTED EAR WAX UNI: CPT | Mod: 50 | Performed by: PHYSICIAN ASSISTANT

## 2022-08-08 ASSESSMENT — ENCOUNTER SYMPTOMS: RHINORRHEA: 0

## 2022-08-08 NOTE — PROGRESS NOTES
"  Assessment & Plan     Bilateral impacted cerumen  Resolved               BMI:   Estimated body mass index is 33.89 kg/m  as calculated from the following:    Height as of 12/30/21: 1.632 m (5' 4.25\").    Weight as of this encounter: 90.3 kg (199 lb).           No follow-ups on file.    CARIDAD Coates  [facility]    Kaylyn Serrato is a 52 year old, presenting for the following health issues:  Ear Problem      52-year-old female presents to the clinic with muffled full sensation in her ears has been long standing long going problem but more acute as of late  No pain  She is never had her ears lavaged    History of Present Illness       Reason for visit:  I cannot hear out of my right ear.  I have felt water/fluid in my ears on and off for a couple years. This blockage causing hearing loss began in early July.  Symptom onset:  More than a month  Symptoms include:  Blockage in right ear, cannot hear  Symptom intensity:  Moderate  Symptom progression:  Staying the same  Had these symptoms before:  No  What makes it worse:  Going under water in the pool  What makes it better:  No    She eats 0-1 servings of fruits and vegetables daily.She consumes 1 sweetened beverage(s) daily.She exercises with enough effort to increase her heart rate 10 to 19 minutes per day.  She exercises with enough effort to increase her heart rate 3 or less days per week.   She is taking medications regularly.         Review of Systems   HENT: Positive for hearing loss. Negative for congestion and rhinorrhea.    Skin: Positive for rash.            Objective    There were no vitals taken for this visit.  There is no height or weight on file to calculate BMI.  Physical Exam canals occluded with cerumen lavaged with good results afterwards canals and drums normal and her symptoms resolved                      .  ..  "

## 2022-10-02 ENCOUNTER — HEALTH MAINTENANCE LETTER (OUTPATIENT)
Age: 52
End: 2022-10-02

## 2022-10-03 ENCOUNTER — DOCUMENTATION ONLY (OUTPATIENT)
Dept: LAB | Facility: CLINIC | Age: 52
End: 2022-10-03

## 2022-10-04 ENCOUNTER — LAB (OUTPATIENT)
Dept: LAB | Facility: CLINIC | Age: 52
End: 2022-10-04
Payer: COMMERCIAL

## 2022-10-04 ENCOUNTER — MYC MEDICAL ADVICE (OUTPATIENT)
Dept: FAMILY MEDICINE | Facility: CLINIC | Age: 52
End: 2022-10-04

## 2022-10-04 DIAGNOSIS — E03.1 CONGENITAL HYPOTHYROIDISM WITHOUT GOITER: ICD-10-CM

## 2022-10-04 DIAGNOSIS — E03.1 CONGENITAL HYPOTHYROIDISM WITHOUT GOITER: Primary | ICD-10-CM

## 2022-10-04 LAB
T4 FREE SERPL-MCNC: 1.35 NG/DL (ref 0.9–1.7)
TSH SERPL DL<=0.005 MIU/L-ACNC: 0.17 UIU/ML (ref 0.3–4.2)

## 2022-10-04 PROCEDURE — 84443 ASSAY THYROID STIM HORMONE: CPT

## 2022-10-04 PROCEDURE — 36415 COLL VENOUS BLD VENIPUNCTURE: CPT

## 2022-10-04 PROCEDURE — 84439 ASSAY OF FREE THYROXINE: CPT

## 2022-10-04 RX ORDER — LEVOTHYROXINE SODIUM 137 UG/1
137 TABLET ORAL DAILY
Qty: 90 TABLET | Refills: 0 | Status: SHIPPED | OUTPATIENT
Start: 2022-10-04 | End: 2023-01-13

## 2022-10-04 NOTE — TELEPHONE ENCOUNTER
I sent in 137 mcg for 90 days.    Please see my chart message and advise on new dose of Levothyroxine.

## 2023-01-04 DIAGNOSIS — E03.1 CONGENITAL HYPOTHYROIDISM WITHOUT GOITER: ICD-10-CM

## 2023-01-04 NOTE — TELEPHONE ENCOUNTER
Last Written Prescription Date:  10/4/22  Last Fill Quantity: 90,  # refills: 0   Last office visit: 8/8/2022 with prescribing provider:  Ear problem   Future Office Visit:  Past due for follow up       Routing refill request to provider for review/approval because:  Labs out of range:        Shareightt Message sent to patient asking her to make lab appointment and visit appointment     Holly Rodriguez RN

## 2023-01-09 DIAGNOSIS — E03.9 HYPOTHYROIDISM, UNSPECIFIED TYPE: Primary | ICD-10-CM

## 2023-01-12 ENCOUNTER — LAB (OUTPATIENT)
Dept: LAB | Facility: CLINIC | Age: 53
End: 2023-01-12
Payer: COMMERCIAL

## 2023-01-12 DIAGNOSIS — E03.9 HYPOTHYROIDISM, UNSPECIFIED TYPE: ICD-10-CM

## 2023-01-12 LAB — TSH SERPL DL<=0.005 MIU/L-ACNC: 0.49 UIU/ML (ref 0.3–4.2)

## 2023-01-12 PROCEDURE — 36415 COLL VENOUS BLD VENIPUNCTURE: CPT

## 2023-01-12 PROCEDURE — 84443 ASSAY THYROID STIM HORMONE: CPT

## 2023-01-13 DIAGNOSIS — E03.1 CONGENITAL HYPOTHYROIDISM WITHOUT GOITER: ICD-10-CM

## 2023-01-13 RX ORDER — LEVOTHYROXINE SODIUM 137 UG/1
137 TABLET ORAL DAILY
Qty: 90 TABLET | Refills: 0 | Status: SHIPPED | OUTPATIENT
Start: 2023-01-13 | End: 2023-04-13

## 2023-01-20 RX ORDER — LEVOTHYROXINE SODIUM 137 UG/1
TABLET ORAL
Qty: 30 TABLET | Refills: 0 | Status: SHIPPED | OUTPATIENT
Start: 2023-01-20

## 2023-02-11 ENCOUNTER — HEALTH MAINTENANCE LETTER (OUTPATIENT)
Age: 53
End: 2023-02-11

## 2023-04-13 DIAGNOSIS — E03.1 CONGENITAL HYPOTHYROIDISM WITHOUT GOITER: ICD-10-CM

## 2023-04-13 RX ORDER — LEVOTHYROXINE SODIUM 137 UG/1
TABLET ORAL
Qty: 90 TABLET | Refills: 0 | Status: SHIPPED | OUTPATIENT
Start: 2023-04-13 | End: 2023-07-26

## 2023-04-13 NOTE — TELEPHONE ENCOUNTER
"    Last office visit: 8/8/2022     Future Appointments 4/13/2023 - 10/10/2023    None          Requested Prescriptions   Pending Prescriptions Disp Refills     levothyroxine (SYNTHROID/LEVOTHROID) 137 MCG tablet [Pharmacy Med Name: LEVOTHYROXINE 137 MCG TABLET] 90 tablet 0     Sig: TAKE 1 TABLET BY MOUTH EVERY DAY       Thyroid Protocol Passed - 4/13/2023  1:23 AM        Passed - Patient is 12 years or older        Passed - Recent (12 mo) or future (30 days) visit within the authorizing provider's specialty     Patient has had an office visit with the authorizing provider or a provider within the authorizing providers department within the previous 12 mos or has a future within next 30 days. See \"Patient Info\" tab in inbasket, or \"Choose Columns\" in Meds & Orders section of the refill encounter.              Passed - Medication is active on med list        Passed - Normal TSH on file in past 12 months     Recent Labs   Lab Test 01/12/23  0850   TSH 0.49              Passed - No active pregnancy on record     If patient is pregnant or has had a positive pregnancy test, please check TSH.          Passed - No positive pregnancy test in past 12 months     If patient is pregnant or has had a positive pregnancy test, please check TSH.                     "

## 2023-05-20 ENCOUNTER — HEALTH MAINTENANCE LETTER (OUTPATIENT)
Age: 53
End: 2023-05-20

## 2023-07-19 DIAGNOSIS — E03.1 CONGENITAL HYPOTHYROIDISM WITHOUT GOITER: ICD-10-CM

## 2023-07-19 NOTE — TELEPHONE ENCOUNTER
"Former patient of Dr. Mcdermott & has not established care with another provider.  Please assign refill request to covering provider per clinic standard process.    Last Written Prescription Date:  4/13/23  Last Fill Quantity: 90,  # refills: 0   Last office visit provider:   8/8/22    Requested Prescriptions   Pending Prescriptions Disp Refills     levothyroxine (SYNTHROID/LEVOTHROID) 137 MCG tablet [Pharmacy Med Name: LEVOTHYROXINE 137 MCG TABLET] 90 tablet 0     Sig: TAKE 1 TABLET BY MOUTH EVERY DAY       Thyroid Protocol Passed - 7/19/2023  2:32 AM        Passed - Patient is 12 years or older        Passed - Recent (12 mo) or future (30 days) visit within the authorizing provider's specialty     Patient has had an office visit with the authorizing provider or a provider within the authorizing providers department within the previous 12 mos or has a future within next 30 days. See \"Patient Info\" tab in inbasket, or \"Choose Columns\" in Meds & Orders section of the refill encounter.              Passed - Medication is active on med list        Passed - Normal TSH on file in past 12 months     Recent Labs   Lab Test 01/12/23  0850   TSH 0.49              Passed - No active pregnancy on record     If patient is pregnant or has had a positive pregnancy test, please check TSH.          Passed - No positive pregnancy test in past 12 months     If patient is pregnant or has had a positive pregnancy test, please check TSH.               Cary Salas RN 07/19/23 4:55 PM  "

## 2023-07-26 RX ORDER — LEVOTHYROXINE SODIUM 137 UG/1
TABLET ORAL
Qty: 90 TABLET | Refills: 0 | Status: SHIPPED | OUTPATIENT
Start: 2023-07-26 | End: 2023-09-11

## 2023-09-11 ENCOUNTER — MYC REFILL (OUTPATIENT)
Dept: FAMILY MEDICINE | Facility: CLINIC | Age: 53
End: 2023-09-11
Payer: COMMERCIAL

## 2023-09-11 DIAGNOSIS — E03.1 CONGENITAL HYPOTHYROIDISM WITHOUT GOITER: ICD-10-CM

## 2023-09-11 RX ORDER — LEVOTHYROXINE SODIUM 137 UG/1
137 TABLET ORAL DAILY
Qty: 30 TABLET | Refills: 0 | Status: SHIPPED | OUTPATIENT
Start: 2023-09-11 | End: 2023-10-13

## 2023-10-13 DIAGNOSIS — E03.1 CONGENITAL HYPOTHYROIDISM WITHOUT GOITER: ICD-10-CM

## 2023-10-13 RX ORDER — LEVOTHYROXINE SODIUM 137 UG/1
137 TABLET ORAL DAILY
Qty: 90 TABLET | Refills: 1 | Status: SHIPPED | OUTPATIENT
Start: 2023-10-13 | End: 2024-06-18

## 2024-03-09 ENCOUNTER — HEALTH MAINTENANCE LETTER (OUTPATIENT)
Age: 54
End: 2024-03-09

## 2024-06-18 DIAGNOSIS — E03.1 CONGENITAL HYPOTHYROIDISM WITHOUT GOITER: ICD-10-CM

## 2024-06-18 RX ORDER — LEVOTHYROXINE SODIUM 137 UG/1
137 TABLET ORAL DAILY
Qty: 90 TABLET | Refills: 1 | Status: SHIPPED | OUTPATIENT
Start: 2024-06-18

## 2025-01-03 DIAGNOSIS — E03.1 CONGENITAL HYPOTHYROIDISM WITHOUT GOITER: ICD-10-CM

## 2025-01-06 RX ORDER — LEVOTHYROXINE SODIUM 137 UG/1
137 TABLET ORAL DAILY
Qty: 30 TABLET | Refills: 0 | OUTPATIENT
Start: 2025-01-06

## 2025-01-06 NOTE — TELEPHONE ENCOUNTER
Patient notified that she needs to be seen as it has been over a year since the last visit. She will call back to get schedule. Please remove refill and close encounter.  Palmira Pascual CMA ............... 2:33 PM, 01/06/25

## 2025-01-06 NOTE — TELEPHONE ENCOUNTER
TC- Please call and assist patient with scheduling visit.   Please route refill request to provider (if ok to bridge to appointment).    12/20/2024   levothyroxine (SYNTHROID/LEVOTHROID) 137 MCG tablet  Dispense: 30 tablet       Last office visit: Visit date not found     Future Appointments 1/6/2025 - 7/5/2025      None            Requested Prescriptions   Pending Prescriptions Disp Refills    levothyroxine (SYNTHROID/LEVOTHROID) 137 MCG tablet [Pharmacy Med Name: LEVOTHYROXINE 137 MCG TABLET] 90 tablet 1     Sig: TAKE 1 TABLET BY MOUTH EVERY DAY       Thyroid Protocol Failed - 1/6/2025 10:45 AM        Failed - Recent (12 mo) or future (90 days) visit within the authorizing provider's specialty     The patient must have completed an in-person or virtual visit within the past 12 months or has a future visit scheduled within the next 90 days with the authorizing provider s specialty.  Urgent care and e-visits do not qualify as an office visit for this protocol.          Failed - Normal TSH on file in past 12 months     Recent Labs   Lab Test 01/12/23  0850   TSH 0.49              Passed - Patient is 12 years or older        Passed - Medication is active on med list        Passed - Medication indicated for associated diagnosis     Medication is associated with one or more of the following diagnoses:  Hypothyroidism  Thyroid stimulating hormone suppression therapy  Thyroid cancer  Acquired atrophy of thyroid          Passed - No active pregnancy on record     If patient is pregnant or has had a positive pregnancy test, please check TSH.          Passed - No positive pregnancy test in past 12 months     If patient is pregnant or has had a positive pregnancy test, please check TSH.

## 2025-02-03 DIAGNOSIS — E03.1 CONGENITAL HYPOTHYROIDISM WITHOUT GOITER: Primary | ICD-10-CM

## 2025-02-03 DIAGNOSIS — E03.1 CONGENITAL HYPOTHYROIDISM WITHOUT GOITER: ICD-10-CM

## 2025-02-03 RX ORDER — LEVOTHYROXINE SODIUM 137 UG/1
137 TABLET ORAL DAILY
Qty: 90 TABLET | Refills: 1 | Status: SHIPPED | OUTPATIENT
Start: 2025-02-03

## 2025-02-03 RX ORDER — LEVOTHYROXINE SODIUM 137 UG/1
137 TABLET ORAL DAILY
Qty: 90 TABLET | Refills: 1 | Status: SHIPPED | OUTPATIENT
Start: 2025-02-03 | End: 2025-02-03

## 2025-03-16 ENCOUNTER — HEALTH MAINTENANCE LETTER (OUTPATIENT)
Age: 55
End: 2025-03-16

## 2025-07-15 ENCOUNTER — OFFICE VISIT (OUTPATIENT)
Dept: URGENT CARE | Facility: URGENT CARE | Age: 55
End: 2025-07-15
Payer: COMMERCIAL

## 2025-07-15 VITALS
BODY MASS INDEX: 33.49 KG/M2 | OXYGEN SATURATION: 97 % | SYSTOLIC BLOOD PRESSURE: 119 MMHG | HEART RATE: 101 BPM | DIASTOLIC BLOOD PRESSURE: 80 MMHG | WEIGHT: 201 LBS | HEIGHT: 65 IN | RESPIRATION RATE: 18 BRPM | TEMPERATURE: 98.8 F

## 2025-07-15 DIAGNOSIS — B02.31 HERPES ZOSTER CONJUNCTIVITIS: Primary | ICD-10-CM

## 2025-07-15 PROCEDURE — 99214 OFFICE O/P EST MOD 30 MIN: CPT | Performed by: PHYSICIAN ASSISTANT

## 2025-07-15 PROCEDURE — 3074F SYST BP LT 130 MM HG: CPT | Performed by: PHYSICIAN ASSISTANT

## 2025-07-15 PROCEDURE — 3079F DIAST BP 80-89 MM HG: CPT | Performed by: PHYSICIAN ASSISTANT

## 2025-07-15 RX ORDER — VALACYCLOVIR HYDROCHLORIDE 1 G/1
1000 TABLET, FILM COATED ORAL 3 TIMES DAILY
Qty: 21 TABLET | Refills: 0 | Status: SHIPPED | OUTPATIENT
Start: 2025-07-15 | End: 2025-07-22

## 2025-07-15 NOTE — PROGRESS NOTES
"  Assessment & Plan:      Problem List Items Addressed This Visit    None  Visit Diagnoses         Herpes zoster conjunctivitis    -  Primary    Relevant Medications    valACYclovir (VALTREX) 1000 mg tablet          Medical Decision Making  Patient presents with a blistering rash of the right forehead and right eye.  Symptoms are consistent with herpes zoster with some concern for conjunctivitis.  Will start patient on oral antivirals and recommend they follow-up immediately with ophthalmology.  Discussed treatment and symptomatic care.  Allergies and medication interactions reviewed.  Discussed signs of worsening symptoms and when to follow-up with PCP if no symptom improvement.     Subjective:      History provided by the patient.  She is here with her .  Ama Green is a 55 year old female here for evaluation of rash and discomfort of the right side of the face and eye.  Onset of symptoms was 4 to 5 days ago.  Patient was outside all day and initially thought she developed some poisoning.  She felt tired and slightly lightheaded the first couple days.  Shortly afterwards a blistering rash showed up on the right forehead.  Patient woke up this morning with swelling, redness, and clear tearing of the right eye.     The following portions of the patient's history were reviewed and updated as appropriate: allergies, current medications, and problem list.     Review of Systems  Pertinent items are noted in HPI.    Allergies  Allergies   Allergen Reactions    Amoxicillin Anaphylaxis and Hives       No family history on file.    Social History     Tobacco Use    Smoking status: Never    Smokeless tobacco: Never   Substance Use Topics    Alcohol use: Yes        Objective:      /80   Pulse 101   Temp 98.8  F (37.1  C) (Oral)   Resp 18   Ht 1.651 m (5' 5\")   Wt 91.2 kg (201 lb)   SpO2 97%   BMI 33.45 kg/m    General appearance - alert, well appearing, and in no distress and non-toxic  Eyes - right: " conjunctivae/sclera injected  Skin - clustered vesicular rash with erythema affecting a single dermatome of the right forehead down to the right upper eyelid and along the bridge of the nose    The use of [employer] dictation services was used to construct the content of this note; any grammatical errors are non-intentional. Please contact the author directly if you are in need of any clarification.

## 2025-07-15 NOTE — PROGRESS NOTES
Urgent Care Clinic Visit    Chief Complaint   Patient presents with    Eye Problem     Right eye swollen and drainage in the sun for 9 hours and got dizzy later in the night, headache     Derm Problem     brown spot on forehead since Saturday possible sun exposure

## 2025-07-22 ENCOUNTER — OFFICE VISIT (OUTPATIENT)
Dept: FAMILY MEDICINE | Facility: CLINIC | Age: 55
End: 2025-07-22
Payer: COMMERCIAL

## 2025-07-22 VITALS
HEART RATE: 84 BPM | SYSTOLIC BLOOD PRESSURE: 110 MMHG | BODY MASS INDEX: 33.95 KG/M2 | RESPIRATION RATE: 16 BRPM | DIASTOLIC BLOOD PRESSURE: 80 MMHG | OXYGEN SATURATION: 98 % | WEIGHT: 204 LBS

## 2025-07-22 DIAGNOSIS — B02.30 HERPES ZOSTER OPHTHALMICUS OF RIGHT EYE: Primary | ICD-10-CM

## 2025-07-22 PROCEDURE — G2211 COMPLEX E/M VISIT ADD ON: HCPCS

## 2025-07-22 PROCEDURE — 3079F DIAST BP 80-89 MM HG: CPT

## 2025-07-22 PROCEDURE — 99213 OFFICE O/P EST LOW 20 MIN: CPT

## 2025-07-22 PROCEDURE — 3074F SYST BP LT 130 MM HG: CPT

## 2025-07-22 RX ORDER — VALACYCLOVIR HYDROCHLORIDE 500 MG/1
500 TABLET, FILM COATED ORAL DAILY
COMMUNITY
Start: 2025-07-18

## 2025-07-22 RX ORDER — GANCICLOVIR 1.5 MG/G
1 GEL OPHTHALMIC
COMMUNITY
Start: 2025-07-16

## 2025-07-22 NOTE — PROGRESS NOTES
"  Assessment & Plan     Herpes zoster ophthalmicus of right eye  First developed on 7/13/2025.  Originally thought it was some sickness  Went to urgent care on 7/15/2025 and was diagnosed with shingles of the right eye in the V1 distribution  She has since been to the ophthalmologist twice and getting eyedrops as well as starting the Valtrex 500 mg daily today  She is having greater improvement but still having some drainage of her eye as well as edema  She does not have any pain with eye movement and there are no changes in the vision except for getting blurry when the drops are placed in  Discussed that she is on the right course of treatment with the Valtrex as well as the eyedrops.  As far as the skin it is best to keep this hydrated with Aquaphor or Vaseline which she has been doing  If patient begins having pain with movement of the eye or worsening vision changes or worsening headache she report to emergency department  She should follow-up with the ophthalmologist    The longitudinal plan of care for the diagnosis(es)/condition(s) as documented were addressed during this visit. Due to the added complexity in care, I will continue to support Ama in the subsequent management and with ongoing continuity of care.    MED REC REQUIRED  Post Medication Reconciliation Status:     BMI  Estimated body mass index is 33.95 kg/m  as calculated from the following:    Height as of 7/15/25: 1.651 m (5' 5\").    Weight as of this encounter: 92.5 kg (204 lb).     Subjective   Ama is a 55 year old, presenting for the following health issues:  Follow Up (Shingles @[facility] urgent care 7/15/25 - getting better, still sensitive to light, headaches. Wants to make sure if there isnt anything that will help treat shingles around the eye, not just the eye itself. )      7/22/2025     2:19 PM   Additional Questions   Roomed by SUSY Perez      Seeing Dr. Yu at [facility]  Woke up on a sundya and felt like she had " had too much to drink so she stayed in the bed all day  Was having zingers in teeth for about a second and eye was watering  On Monday started having rash and redness with blisters  On Tuesday went to ; just took the last one of the 1 gram today and then is going to start at the 500 mg this afternoon  Wednesday - eye was swollen shut, thought eye lid was going burst because of the swelling. Told her to get eye drops in no matter what  On Thursday or Friday, as things were draining down, face was swollen  Went back to the optha on Friday and they were happy with progression    Now  Still having some zingers from dental pain  Can feel lower cheek but not above the eyelid  Up until yesterday was cleaning eye every 60-90 minutes but yesterday only did about 3 times  There was a scab in the medial part of eye  Getting some headaches in the back of the neck and right temporal - using excedrin  No pain with eye movement  Vision is blurry but it is still there  Going back to eye doctor on friday          Objective    /80   Pulse 84   Resp 16   Wt 92.5 kg (204 lb)   SpO2 98%   BMI 33.95 kg/m    Body mass index is 33.95 kg/m .    Physical Exam   GENERAL: Appears well, in no acute distress  HEENT: Normocephalic, Atraumatic. No use of respiratory accessory muscles.  CARDIAC: No apparent distress  LUNG: No apparent distress  SKIN: Blistered and crusted lesions throughout the V1.  Edema of the right eyelid.  Conjunctivitis erythema.  Clear drainage  NEURO: Answers questions appropriately. No apparent weakness   PSYCH: No apparent distress. Answers questions appropriately          Signed Electronically by: Francis Whitman DO

## 2025-07-22 NOTE — PATIENT INSTRUCTIONS
Thank you for seeing us today at [facility].    Continue current medication regimen with eyedrops and Valtrex  Continue keeping the scabs hydrated with Aquaphor or Vaseline  Report to the emergency department if worsening pain, headache, changes in your vision, pain with eye movement    Sincerely,  Dr. AISSATOU Whitman DO, [facility]  [phone]  '

## 2025-07-31 ENCOUNTER — TELEPHONE (OUTPATIENT)
Dept: FAMILY MEDICINE | Facility: CLINIC | Age: 55
End: 2025-07-31
Payer: COMMERCIAL

## 2025-07-31 DIAGNOSIS — B02.30 HERPES ZOSTER OPHTHALMICUS OF RIGHT EYE: Primary | ICD-10-CM

## 2025-07-31 RX ORDER — VALACYCLOVIR HYDROCHLORIDE 500 MG/1
500 TABLET, FILM COATED ORAL DAILY
Qty: 10 TABLET | Refills: 0 | Status: SHIPPED | OUTPATIENT
Start: 2025-07-31

## 2025-07-31 NOTE — TELEPHONE ENCOUNTER
Outgoing call to patient and relayed provider message. Patient stated understanding and is agreeable to the plan of care. She gives her thanks.     Kiara ROBERTS RN

## 2025-07-31 NOTE — TELEPHONE ENCOUNTER
S-(situation): Requesting extension of valacyclovir for shingles     B-(background): OV 7/22/25    Herpes zoster ophthalmicus of right eye  First developed on 7/13/2025.  Originally thought it was some sickness  Went to urgent care on 7/15/2025 and was diagnosed with shingles of the right eye in the V1 distribution  She has since been to the ophthalmologist twice and getting eyedrops as well as starting the Valtrex 500 mg daily today  She is having greater improvement but still having some drainage of her eye as well as edema  She does not have any pain with eye movement and there are no changes in the vision except for getting blurry when the drops are placed in  Discussed that she is on the right course of treatment with the Valtrex as well as the eyedrops.  As far as the skin it is best to keep this hydrated with Aquaphor or Vaseline which she has been doing  If patient begins having pain with movement of the eye or worsening vision changes or worsening headache she report to emergency department  She should follow-up with the ophthalmologist    A-(assessment): Shingles and Face on Right Eye - Eye  gave her a refill, juan jose fatima says she can only do it for 10 days as an eye doctor. Pts eye is still swollen and active, asking for a 1 week supply until she sees eye doc.     R-(recommendations): Routing to last provider pt saw for review and further advise.     Los CALIX RN

## 2025-07-31 NOTE — TELEPHONE ENCOUNTER
General Call    Contacts       Contact Date/Time Type Contact Phone/Fax    07/31/2025 03:58 PM CDT Phone (Incoming) [email or web address] R (Self) [phone] (H)          Reason for Call:  Shingles and Face on Right Eye - Eye  gave her a refill, [place]  says she can only do it for 10 days as an eye doctor. Pts eye is still swollen and active, asking for a 1 week supply until she sees eye doc.     Could we send this information to you in [employer] or would you prefer to receive a phone call?:   Patient would prefer a phone call   Okay to leave a detailed message?: No at Cell number on file:    Telephone Information:   Mobile [phone]